# Patient Record
Sex: MALE
[De-identification: names, ages, dates, MRNs, and addresses within clinical notes are randomized per-mention and may not be internally consistent; named-entity substitution may affect disease eponyms.]

---

## 2022-12-14 ENCOUNTER — NON-APPOINTMENT (OUTPATIENT)
Age: 52
End: 2022-12-14

## 2022-12-25 ENCOUNTER — NON-APPOINTMENT (OUTPATIENT)
Age: 52
End: 2022-12-25

## 2022-12-30 ENCOUNTER — APPOINTMENT (OUTPATIENT)
Dept: UROLOGY | Facility: CLINIC | Age: 52
End: 2022-12-30
Payer: MEDICAID

## 2022-12-30 VITALS
BODY MASS INDEX: 25.33 KG/M2 | DIASTOLIC BLOOD PRESSURE: 85 MMHG | SYSTOLIC BLOOD PRESSURE: 134 MMHG | WEIGHT: 171 LBS | RESPIRATION RATE: 16 BRPM | HEIGHT: 69 IN | TEMPERATURE: 97.5 F | HEART RATE: 66 BPM | OXYGEN SATURATION: 98 %

## 2022-12-30 DIAGNOSIS — R35.0 FREQUENCY OF MICTURITION: ICD-10-CM

## 2022-12-30 DIAGNOSIS — Z12.5 ENCOUNTER FOR SCREENING FOR MALIGNANT NEOPLASM OF PROSTATE: ICD-10-CM

## 2022-12-30 PROBLEM — Z00.00 ENCOUNTER FOR PREVENTIVE HEALTH EXAMINATION: Status: ACTIVE | Noted: 2022-12-30

## 2022-12-30 PROCEDURE — 99204 OFFICE O/P NEW MOD 45 MIN: CPT

## 2023-01-01 PROBLEM — Z12.5 PROSTATE CANCER SCREENING: Status: ACTIVE | Noted: 2022-12-30

## 2023-01-02 RX ORDER — MELOXICAM 7.5 MG/1
7.5 TABLET ORAL
Qty: 7 | Refills: 0 | Status: ACTIVE | COMMUNITY
Start: 2022-12-26

## 2023-01-02 RX ORDER — NAPROXEN 500 MG/1
500 TABLET ORAL
Qty: 14 | Refills: 0 | Status: ACTIVE | COMMUNITY
Start: 2022-12-15

## 2023-01-02 RX ORDER — MUPIROCIN 20 MG/G
2 OINTMENT TOPICAL
Qty: 22 | Refills: 0 | Status: ACTIVE | COMMUNITY
Start: 2022-12-27

## 2023-01-02 RX ORDER — CYCLOBENZAPRINE HYDROCHLORIDE 10 MG/1
10 TABLET, FILM COATED ORAL
Qty: 7 | Refills: 0 | Status: ACTIVE | COMMUNITY
Start: 2022-12-26

## 2023-01-02 NOTE — HISTORY OF PRESENT ILLNESS
[FreeTextEntry1] : This consultation was held with a Georgian  for the entirety of the visit.\par \par Luis Coles comes presents to the office today.  He is a 52-year-old man here with a primary complaint of left lower back pain which radiates down his left leg all the way down to his left foot.  He also has a secondary complaint of urinary frequency and nocturia of 4 or 5 times.  He says the symptoms have been going on for the last 4 to 5 months.  He notes that the stream is still reasonable but not as strong as it used to be.  He denies hesitancy or sensation of incomplete emptying.  No history of hematuria or dysuria.  No history of retention or infection.  He denies history of kidney stones.\par \par The patient has no personal history of BPH and is not on any prostate or bladder related medications.

## 2023-01-02 NOTE — ASSESSMENT
[FreeTextEntry1] : From the patient's description of his pain symptoms which involve an area from the iliac spine down his left leg also involving his foot, I would suspect that this is most likely either related to sciatic nerve compression or musculoskeletal issues.  I do not have any cause to believe this is urinary in nature as there is no association with urination.  I suggested that he consider seeing an orthopedic surgeon or neurologist for further evaluation of these findings.\par \par With regard to the urinary complaints of frequency and nocturia, I offered him treatment options of both alpha blockers and anticholinergics to see if he would be interested in either approach.  He says he would prefer more of a "natural" medication.  I explained to him that there are herbal supplements on the market that are meant to help address urinary symptoms but there is little evidence-based support of these remedies such as saw palmetto which is felt to be similar to placebo in its efficacy.  Regardless he may choose to try this and does not wish to have a prescription from me today.  \par \par We also reviewed prostate cancer screening.  I have explained to him that at the age of 52 it would probably be a good idea for him to have his first PSA level which has never been done in the past.  At the time of his visit, our phlebotomy laboratory was unavailable but I did provide him with a prescription to take to any laboratory for the PSA and I will review it with him once I receive it.

## 2023-01-04 LAB
APPEARANCE: CLEAR
BACTERIA: NEGATIVE
BILIRUBIN URINE: NEGATIVE
BLOOD URINE: NEGATIVE
COLOR: COLORLESS
GLUCOSE QUALITATIVE U: NEGATIVE
HYALINE CASTS: 0 /LPF
KETONES URINE: NEGATIVE
LEUKOCYTE ESTERASE URINE: NEGATIVE
MICROSCOPIC-UA: NORMAL
NITRITE URINE: NEGATIVE
PH URINE: 6.5
PROTEIN URINE: NEGATIVE
RED BLOOD CELLS URINE: 0 /HPF
SPECIFIC GRAVITY URINE: 1.01
SQUAMOUS EPITHELIAL CELLS: 0 /HPF
UROBILINOGEN URINE: NORMAL
WHITE BLOOD CELLS URINE: 0 /HPF

## 2023-01-09 ENCOUNTER — APPOINTMENT (OUTPATIENT)
Dept: UROLOGY | Facility: CLINIC | Age: 53
End: 2023-01-09

## 2025-01-21 ENCOUNTER — NON-APPOINTMENT (OUTPATIENT)
Age: 55
End: 2025-01-21